# Patient Record
Sex: FEMALE | Race: WHITE | Employment: OTHER
[De-identification: names, ages, dates, MRNs, and addresses within clinical notes are randomized per-mention and may not be internally consistent; named-entity substitution may affect disease eponyms.]

---

## 2018-11-28 ENCOUNTER — RX ONLY (RX ONLY)
Age: 63
End: 2018-11-28

## 2018-11-28 ENCOUNTER — DOCTOR'S OFFICE (OUTPATIENT)
Dept: URBAN - METROPOLITAN AREA CLINIC 136 | Facility: CLINIC | Age: 63
Setting detail: OPHTHALMOLOGY
End: 2018-11-28
Payer: COMMERCIAL

## 2018-11-28 DIAGNOSIS — H27.8: ICD-10-CM

## 2018-11-28 DIAGNOSIS — E11.9: ICD-10-CM

## 2018-11-28 DIAGNOSIS — H27.9: ICD-10-CM

## 2018-11-28 DIAGNOSIS — H26.40: ICD-10-CM

## 2018-11-28 DIAGNOSIS — H40.053: ICD-10-CM

## 2018-11-28 DIAGNOSIS — H43.812: ICD-10-CM

## 2018-11-28 PROCEDURE — 92004 COMPRE OPH EXAM NEW PT 1/>: CPT | Performed by: OPHTHALMOLOGY

## 2018-11-28 PROCEDURE — 76514 ECHO EXAM OF EYE THICKNESS: CPT | Performed by: OPHTHALMOLOGY

## 2018-11-28 PROCEDURE — 92133 CPTRZD OPH DX IMG PST SGM ON: CPT | Performed by: OPHTHALMOLOGY

## 2018-11-28 ASSESSMENT — REFRACTION_MANIFEST
OD_VA2: 20/
OS_VA3: 20/
OS_VA2: 20/
OU_VA: 20/
OS_VA3: 20/
OD_VA1: 20/
OD_VA1: 20/
OD_VA2: 20/
OS_VA1: 20/
OS_VA2: 20/
OU_VA: 20/
OD_VA3: 20/
OD_VA3: 20/
OS_VA1: 20/

## 2018-11-28 ASSESSMENT — REFRACTION_AUTOREFRACTION
OD_AXIS: 092
OS_CYLINDER: -0.50
OD_SPHERE: +1.75
OS_SPHERE: +0.50
OD_CYLINDER: -0.75
OS_AXIS: 165

## 2018-11-28 ASSESSMENT — REFRACTION_CURRENTRX
OS_OVR_VA: 20/
OS_OVR_VA: 20/
OD_OVR_VA: 20/
OS_OVR_VA: 20/
OD_OVR_VA: 20/
OD_OVR_VA: 20/

## 2018-11-28 ASSESSMENT — PACHYMETRY
OD_CT_UM: 563
OS_CT_CORRECTION: -2
OS_CT_UM: 570
OD_CT_CORRECTION: -1

## 2018-11-28 ASSESSMENT — VISUAL ACUITY
OS_BCVA: 20/50+2
OD_BCVA: 20/30+2

## 2018-11-28 ASSESSMENT — SPHEQUIV_DERIVED
OD_SPHEQUIV: 1.375
OS_SPHEQUIV: 0.25

## 2018-11-28 ASSESSMENT — CONFRONTATIONAL VISUAL FIELD TEST (CVF)
OS_FINDINGS: FULL
OD_FINDINGS: FULL

## 2018-12-11 ENCOUNTER — DOCTOR'S OFFICE (OUTPATIENT)
Dept: URBAN - METROPOLITAN AREA CLINIC 136 | Facility: CLINIC | Age: 63
Setting detail: OPHTHALMOLOGY
End: 2018-12-11
Payer: COMMERCIAL

## 2018-12-11 DIAGNOSIS — H40.053: ICD-10-CM

## 2018-12-11 DIAGNOSIS — E11.9: ICD-10-CM

## 2018-12-11 PROCEDURE — 92012 INTRM OPH EXAM EST PATIENT: CPT | Performed by: OPHTHALMOLOGY

## 2018-12-11 ASSESSMENT — REFRACTION_MANIFEST
OD_VA3: 20/
OU_VA: 20/
OS_VA2: 20/
OD_VA2: 20/
OS_VA3: 20/
OU_VA: 20/
OD_VA1: 20/
OS_VA1: 20/
OS_VA1: 20/
OS_VA3: 20/
OD_VA1: 20/
OD_VA2: 20/
OS_VA2: 20/
OD_VA3: 20/

## 2018-12-11 ASSESSMENT — VISUAL ACUITY
OS_BCVA: 20/40-2
OD_BCVA: 20/30+1

## 2018-12-11 ASSESSMENT — REFRACTION_AUTOREFRACTION
OD_AXIS: 092
OS_SPHERE: +0.50
OD_SPHERE: +1.75
OD_CYLINDER: -0.75
OS_AXIS: 165
OS_CYLINDER: -0.50

## 2018-12-11 ASSESSMENT — REFRACTION_CURRENTRX
OD_OVR_VA: 20/
OD_OVR_VA: 20/
OS_OVR_VA: 20/
OS_OVR_VA: 20/
OD_OVR_VA: 20/
OS_OVR_VA: 20/

## 2018-12-11 ASSESSMENT — SPHEQUIV_DERIVED
OS_SPHEQUIV: 0.25
OD_SPHEQUIV: 1.375

## 2018-12-11 ASSESSMENT — CONFRONTATIONAL VISUAL FIELD TEST (CVF)
OD_FINDINGS: FULL
OS_FINDINGS: FULL

## 2019-01-08 ENCOUNTER — DOCTOR'S OFFICE (OUTPATIENT)
Dept: URBAN - METROPOLITAN AREA CLINIC 136 | Facility: CLINIC | Age: 64
Setting detail: OPHTHALMOLOGY
End: 2019-01-08
Payer: COMMERCIAL

## 2019-01-08 ENCOUNTER — RX ONLY (RX ONLY)
Age: 64
End: 2019-01-08

## 2019-01-08 DIAGNOSIS — H40.053: ICD-10-CM

## 2019-01-08 DIAGNOSIS — H20.011: ICD-10-CM

## 2019-01-08 PROCEDURE — 92012 INTRM OPH EXAM EST PATIENT: CPT | Performed by: OPHTHALMOLOGY

## 2019-01-08 ASSESSMENT — REFRACTION_CURRENTRX
OD_OVR_VA: 20/
OS_OVR_VA: 20/
OD_OVR_VA: 20/
OS_OVR_VA: 20/
OS_OVR_VA: 20/
OD_OVR_VA: 20/

## 2019-01-08 ASSESSMENT — REFRACTION_AUTOREFRACTION
OD_CYLINDER: -0.75
OD_AXIS: 092
OS_CYLINDER: -0.50
OS_AXIS: 165
OD_SPHERE: +1.75
OS_SPHERE: +0.50

## 2019-01-08 ASSESSMENT — REFRACTION_MANIFEST
OD_VA2: 20/
OS_VA3: 20/
OD_VA1: 20/
OS_VA2: 20/
OS_VA1: 20/
OS_VA3: 20/
OD_VA1: 20/
OU_VA: 20/
OD_VA2: 20/
OU_VA: 20/
OS_VA2: 20/
OD_VA3: 20/
OD_VA3: 20/
OS_VA1: 20/

## 2019-01-08 ASSESSMENT — VISUAL ACUITY
OD_BCVA: 20/25+2
OS_BCVA: 20/40-2

## 2019-01-08 ASSESSMENT — SPHEQUIV_DERIVED
OD_SPHEQUIV: 1.375
OS_SPHEQUIV: 0.25

## 2019-01-08 ASSESSMENT — CONFRONTATIONAL VISUAL FIELD TEST (CVF)
OS_FINDINGS: FULL
OD_FINDINGS: FULL

## 2019-01-22 ENCOUNTER — DOCTOR'S OFFICE (OUTPATIENT)
Dept: URBAN - METROPOLITAN AREA CLINIC 136 | Facility: CLINIC | Age: 64
Setting detail: OPHTHALMOLOGY
End: 2019-01-22
Payer: COMMERCIAL

## 2019-01-22 ENCOUNTER — RX ONLY (RX ONLY)
Age: 64
End: 2019-01-22

## 2019-01-22 DIAGNOSIS — H40.053: ICD-10-CM

## 2019-01-22 DIAGNOSIS — H20.011: ICD-10-CM

## 2019-01-22 PROCEDURE — 92012 INTRM OPH EXAM EST PATIENT: CPT | Performed by: OPHTHALMOLOGY

## 2019-01-22 ASSESSMENT — REFRACTION_CURRENTRX
OS_OVR_VA: 20/
OD_OVR_VA: 20/
OD_OVR_VA: 20/
OS_OVR_VA: 20/
OD_OVR_VA: 20/
OS_OVR_VA: 20/

## 2019-01-22 ASSESSMENT — VISUAL ACUITY
OS_BCVA: 20/40
OD_BCVA: 20/25-1

## 2019-01-22 ASSESSMENT — SPHEQUIV_DERIVED
OD_SPHEQUIV: 1.375
OS_SPHEQUIV: 0.25

## 2019-01-22 ASSESSMENT — REFRACTION_MANIFEST
OD_VA2: 20/
OS_VA3: 20/
OU_VA: 20/
OD_VA3: 20/
OS_VA2: 20/
OS_VA1: 20/
OS_VA3: 20/
OS_VA1: 20/
OD_VA1: 20/
OU_VA: 20/
OD_VA1: 20/
OS_VA2: 20/
OD_VA3: 20/
OD_VA2: 20/

## 2019-01-22 ASSESSMENT — REFRACTION_AUTOREFRACTION
OD_CYLINDER: -0.75
OS_CYLINDER: -0.50
OS_AXIS: 165
OD_SPHERE: +1.75
OD_AXIS: 092
OS_SPHERE: +0.50

## 2019-01-22 ASSESSMENT — CONFRONTATIONAL VISUAL FIELD TEST (CVF)
OD_FINDINGS: FULL
OS_FINDINGS: FULL

## 2019-01-29 ENCOUNTER — DOCTOR'S OFFICE (OUTPATIENT)
Dept: URBAN - METROPOLITAN AREA CLINIC 136 | Facility: CLINIC | Age: 64
Setting detail: OPHTHALMOLOGY
End: 2019-01-29
Payer: COMMERCIAL

## 2019-01-29 ENCOUNTER — RX ONLY (RX ONLY)
Age: 64
End: 2019-01-29

## 2019-01-29 DIAGNOSIS — H40.053: ICD-10-CM

## 2019-01-29 DIAGNOSIS — H20.011: ICD-10-CM

## 2019-01-29 PROCEDURE — 92012 INTRM OPH EXAM EST PATIENT: CPT | Performed by: OPHTHALMOLOGY

## 2019-01-29 ASSESSMENT — REFRACTION_CURRENTRX
OS_OVR_VA: 20/
OD_OVR_VA: 20/
OD_OVR_VA: 20/
OS_OVR_VA: 20/
OS_OVR_VA: 20/
OD_OVR_VA: 20/

## 2019-01-29 ASSESSMENT — REFRACTION_MANIFEST
OS_VA2: 20/
OD_VA3: 20/
OD_VA2: 20/
OS_VA1: 20/
OD_VA1: 20/
OS_VA3: 20/
OS_VA1: 20/
OS_VA2: 20/
OS_VA3: 20/
OD_VA1: 20/
OD_VA2: 20/
OD_VA3: 20/
OU_VA: 20/
OU_VA: 20/

## 2019-01-29 ASSESSMENT — REFRACTION_AUTOREFRACTION
OD_CYLINDER: -0.50
OD_AXIS: 070
OS_SPHERE: PLANO
OD_SPHERE: +1.00
OS_CYLINDER: -0.25
OS_AXIS: 081

## 2019-01-29 ASSESSMENT — CONFRONTATIONAL VISUAL FIELD TEST (CVF)
OD_FINDINGS: FULL
OS_FINDINGS: FULL

## 2019-01-29 ASSESSMENT — VISUAL ACUITY
OS_BCVA: 20/40
OD_BCVA: 20/25-1

## 2019-01-29 ASSESSMENT — SPHEQUIV_DERIVED: OD_SPHEQUIV: 0.75

## 2019-02-27 ENCOUNTER — DOCTOR'S OFFICE (OUTPATIENT)
Dept: URBAN - METROPOLITAN AREA CLINIC 136 | Facility: CLINIC | Age: 64
Setting detail: OPHTHALMOLOGY
End: 2019-02-27
Payer: COMMERCIAL

## 2019-02-27 DIAGNOSIS — H20.011: ICD-10-CM

## 2019-02-27 DIAGNOSIS — H40.053: ICD-10-CM

## 2019-02-27 PROCEDURE — 99212 OFFICE O/P EST SF 10 MIN: CPT | Performed by: OPHTHALMOLOGY

## 2019-02-27 ASSESSMENT — REFRACTION_MANIFEST
OS_VA1: 20/
OD_VA1: 20/
OD_VA3: 20/
OD_VA1: 20/
OS_VA2: 20/
OS_VA1: 20/
OD_VA3: 20/
OU_VA: 20/
OD_VA2: 20/
OS_VA3: 20/
OD_VA2: 20/
OS_VA3: 20/
OU_VA: 20/
OS_VA2: 20/

## 2019-02-27 ASSESSMENT — CONFRONTATIONAL VISUAL FIELD TEST (CVF)
OD_FINDINGS: FULL
OS_FINDINGS: FULL

## 2019-02-27 ASSESSMENT — REFRACTION_CURRENTRX
OS_OVR_VA: 20/
OS_OVR_VA: 20/
OD_OVR_VA: 20/
OS_OVR_VA: 20/

## 2019-02-27 ASSESSMENT — REFRACTION_AUTOREFRACTION
OS_AXIS: 081
OD_CYLINDER: -0.50
OD_AXIS: 070
OS_SPHERE: PLANO
OD_SPHERE: +1.00
OS_CYLINDER: -0.25

## 2019-02-27 ASSESSMENT — VISUAL ACUITY
OS_BCVA: 20/25-2
OD_BCVA: 20/25-1

## 2019-02-27 ASSESSMENT — SPHEQUIV_DERIVED: OD_SPHEQUIV: 0.75

## 2019-04-09 ENCOUNTER — DOCTOR'S OFFICE (OUTPATIENT)
Dept: URBAN - METROPOLITAN AREA CLINIC 136 | Facility: CLINIC | Age: 64
Setting detail: OPHTHALMOLOGY
End: 2019-04-09
Payer: COMMERCIAL

## 2019-04-09 DIAGNOSIS — H40.053: ICD-10-CM

## 2019-04-09 DIAGNOSIS — H20.011: ICD-10-CM

## 2019-04-09 PROCEDURE — 92012 INTRM OPH EXAM EST PATIENT: CPT | Performed by: OPHTHALMOLOGY

## 2019-04-09 ASSESSMENT — REFRACTION_MANIFEST
OS_VA3: 20/
OD_VA1: 20/
OD_VA1: 20/
OS_VA2: 20/
OD_VA3: 20/
OU_VA: 20/
OD_VA3: 20/
OD_VA2: 20/
OS_VA1: 20/
OS_VA3: 20/
OD_VA2: 20/
OS_VA1: 20/
OS_VA2: 20/
OU_VA: 20/

## 2019-04-09 ASSESSMENT — TEAR BREAK UP TIME (TBUT)
OS_TBUT: 1+
OD_TBUT: 1+

## 2019-04-09 ASSESSMENT — REFRACTION_AUTOREFRACTION
OS_AXIS: 081
OD_AXIS: 070
OD_CYLINDER: -0.50
OS_CYLINDER: -0.25
OS_SPHERE: PLANO
OD_SPHERE: +1.00

## 2019-04-09 ASSESSMENT — REFRACTION_CURRENTRX
OD_OVR_VA: 20/
OS_OVR_VA: 20/
OS_OVR_VA: 20/
OD_OVR_VA: 20/
OS_OVR_VA: 20/
OD_OVR_VA: 20/

## 2019-04-09 ASSESSMENT — VISUAL ACUITY
OS_BCVA: 20/30-2
OD_BCVA: 20/25-2

## 2019-04-09 ASSESSMENT — SPHEQUIV_DERIVED: OD_SPHEQUIV: 0.75

## 2019-05-21 ENCOUNTER — DOCTOR'S OFFICE (OUTPATIENT)
Dept: URBAN - METROPOLITAN AREA CLINIC 136 | Facility: CLINIC | Age: 64
Setting detail: OPHTHALMOLOGY
End: 2019-05-21
Payer: COMMERCIAL

## 2019-05-21 DIAGNOSIS — H20.011: ICD-10-CM

## 2019-05-21 DIAGNOSIS — H40.053: ICD-10-CM

## 2019-05-21 PROCEDURE — 92012 INTRM OPH EXAM EST PATIENT: CPT | Performed by: OPHTHALMOLOGY

## 2019-05-21 ASSESSMENT — REFRACTION_AUTOREFRACTION
OD_SPHERE: +2.00
OS_SPHERE: +1.00
OD_AXIS: 078
OS_AXIS: 026
OS_CYLINDER: -0.75
OD_CYLINDER: -0.75

## 2019-05-21 ASSESSMENT — REFRACTION_CURRENTRX
OD_OVR_VA: 20/
OS_OVR_VA: 20/
OD_OVR_VA: 20/
OS_OVR_VA: 20/
OS_OVR_VA: 20/
OD_OVR_VA: 20/

## 2019-05-21 ASSESSMENT — REFRACTION_MANIFEST
OU_VA: 20/
OU_VA: 20/
OD_VA3: 20/
OD_VA2: 20/
OS_VA3: 20/
OD_VA1: 20/
OD_VA1: 20/
OS_VA1: 20/
OS_VA3: 20/
OS_VA1: 20/
OD_VA2: 20/
OS_VA2: 20/
OS_VA2: 20/
OD_VA3: 20/

## 2019-05-21 ASSESSMENT — TEAR BREAK UP TIME (TBUT)
OD_TBUT: 1+
OS_TBUT: 1+

## 2019-05-21 ASSESSMENT — VISUAL ACUITY
OS_BCVA: 20/30-2
OD_BCVA: 20/40

## 2019-05-21 ASSESSMENT — SPHEQUIV_DERIVED
OD_SPHEQUIV: 1.625
OS_SPHEQUIV: 0.625

## 2019-05-21 ASSESSMENT — CONFRONTATIONAL VISUAL FIELD TEST (CVF)
OS_FINDINGS: FULL
OD_FINDINGS: FULL

## 2019-08-21 ENCOUNTER — DOCTOR'S OFFICE (OUTPATIENT)
Dept: URBAN - METROPOLITAN AREA CLINIC 136 | Facility: CLINIC | Age: 64
Setting detail: OPHTHALMOLOGY
End: 2019-08-21
Payer: COMMERCIAL

## 2019-08-21 DIAGNOSIS — H40.053: ICD-10-CM

## 2019-08-21 PROCEDURE — 92012 INTRM OPH EXAM EST PATIENT: CPT | Performed by: OPHTHALMOLOGY

## 2019-08-21 ASSESSMENT — REFRACTION_CURRENTRX
OS_OVR_VA: 20/
OD_OVR_VA: 20/
OD_OVR_VA: 20/
OS_OVR_VA: 20/
OD_OVR_VA: 20/
OS_OVR_VA: 20/

## 2019-08-21 ASSESSMENT — REFRACTION_MANIFEST
OS_VA1: 20/
OS_VA3: 20/
OS_VA2: 20/
OD_VA2: 20/
OU_VA: 20/
OD_VA2: 20/
OD_VA3: 20/
OD_VA1: 20/
OU_VA: 20/
OD_VA1: 20/
OS_VA3: 20/
OS_VA2: 20/
OS_VA1: 20/
OD_VA3: 20/

## 2019-08-21 ASSESSMENT — SPHEQUIV_DERIVED
OS_SPHEQUIV: 0.625
OD_SPHEQUIV: 1.625

## 2019-08-21 ASSESSMENT — SUPERFICIAL PUNCTATE KERATITIS (SPK)
OS_SPK: 1+ 2+
OD_SPK: 1+ 2+

## 2019-08-21 ASSESSMENT — TEAR BREAK UP TIME (TBUT)
OD_TBUT: 1+
OS_TBUT: 1+

## 2019-08-21 ASSESSMENT — REFRACTION_AUTOREFRACTION
OS_SPHERE: +1.00
OS_AXIS: 026
OS_CYLINDER: -0.75
OD_AXIS: 078
OD_SPHERE: +2.00
OD_CYLINDER: -0.75

## 2019-08-21 ASSESSMENT — VISUAL ACUITY
OD_BCVA: 20/25-2
OS_BCVA: 20/40+1

## 2019-10-01 ENCOUNTER — DOCTOR'S OFFICE (OUTPATIENT)
Dept: URBAN - METROPOLITAN AREA CLINIC 136 | Facility: CLINIC | Age: 64
Setting detail: OPHTHALMOLOGY
End: 2019-10-01
Payer: COMMERCIAL

## 2019-10-01 DIAGNOSIS — H40.053: ICD-10-CM

## 2019-10-01 DIAGNOSIS — H04.123: ICD-10-CM

## 2019-10-01 PROCEDURE — 92012 INTRM OPH EXAM EST PATIENT: CPT | Performed by: OPHTHALMOLOGY

## 2019-10-01 ASSESSMENT — REFRACTION_MANIFEST
OS_VA2: 20/
OD_VA2: 20/
OD_VA1: 20/
OD_VA3: 20/
OD_VA3: 20/
OU_VA: 20/
OS_VA1: 20/
OS_VA3: 20/
OS_VA3: 20/
OS_VA1: 20/
OU_VA: 20/
OD_VA2: 20/
OS_VA2: 20/
OD_VA1: 20/

## 2019-10-01 ASSESSMENT — REFRACTION_CURRENTRX
OS_OVR_VA: 20/
OS_OVR_VA: 20/
OD_OVR_VA: 20/
OS_OVR_VA: 20/
OD_OVR_VA: 20/
OD_OVR_VA: 20/

## 2019-10-01 ASSESSMENT — KERATOMETRY
OD_AXISANGLE_DEGREES: 090
OS_AXISANGLE_DEGREES: 078
OD_K1POWER_DIOPTERS: 46.75
OS_K1POWER_DIOPTERS: 46.50
OD_K2POWER_DIOPTERS: 46.75
OS_K2POWER_DIOPTERS: 47.00

## 2019-10-01 ASSESSMENT — AXIALLENGTH_DERIVED
OS_AL: 22.3677
OD_AL: 21.9806

## 2019-10-01 ASSESSMENT — REFRACTION_AUTOREFRACTION
OS_SPHERE: +0.25
OS_AXIS: 000
OD_SPHERE: +1.50
OD_AXIS: 090
OS_CYLINDER: 0.00
OD_CYLINDER: -0.25

## 2019-10-01 ASSESSMENT — SPHEQUIV_DERIVED
OS_SPHEQUIV: 0.25
OD_SPHEQUIV: 1.375

## 2019-10-01 ASSESSMENT — CONFRONTATIONAL VISUAL FIELD TEST (CVF)
OD_FINDINGS: FULL
OS_FINDINGS: FULL

## 2019-10-01 ASSESSMENT — VISUAL ACUITY
OS_BCVA: 20/40+2
OD_BCVA: 20/20-1

## 2019-10-01 ASSESSMENT — TEAR BREAK UP TIME (TBUT)
OD_TBUT: 1+
OS_TBUT: 1+

## 2020-01-07 ENCOUNTER — DOCTOR'S OFFICE (OUTPATIENT)
Dept: URBAN - METROPOLITAN AREA CLINIC 136 | Facility: CLINIC | Age: 65
Setting detail: OPHTHALMOLOGY
End: 2020-01-07
Payer: COMMERCIAL

## 2020-01-07 DIAGNOSIS — H04.121: ICD-10-CM

## 2020-01-07 DIAGNOSIS — H26.40: ICD-10-CM

## 2020-01-07 DIAGNOSIS — H04.122: ICD-10-CM

## 2020-01-07 DIAGNOSIS — H40.053: ICD-10-CM

## 2020-01-07 PROCEDURE — 92014 COMPRE OPH EXAM EST PT 1/>: CPT | Performed by: OPHTHALMOLOGY

## 2020-01-07 PROCEDURE — 76514 ECHO EXAM OF EYE THICKNESS: CPT | Performed by: OPHTHALMOLOGY

## 2020-01-07 PROCEDURE — 92133 CPTRZD OPH DX IMG PST SGM ON: CPT | Performed by: OPHTHALMOLOGY

## 2020-01-07 ASSESSMENT — REFRACTION_AUTOREFRACTION
OD_AXIS: 090
OS_AXIS: 000
OS_SPHERE: +0.25
OS_CYLINDER: 0.00
OD_CYLINDER: -0.25
OD_SPHERE: +1.50

## 2020-01-07 ASSESSMENT — TEAR BREAK UP TIME (TBUT)
OD_TBUT: 1+
OS_TBUT: 1+

## 2020-01-07 ASSESSMENT — CONFRONTATIONAL VISUAL FIELD TEST (CVF)
OD_FINDINGS: FULL
OS_FINDINGS: FULL

## 2020-01-07 ASSESSMENT — PACHYMETRY
OD_CT_CORRECTION: -1
OD_CT_UM: 558
OS_CT_CORRECTION: -1
OS_CT_UM: 558

## 2020-01-07 ASSESSMENT — VISUAL ACUITY
OS_BCVA: 20/30-2
OD_BCVA: 20/20-1

## 2020-01-07 ASSESSMENT — KERATOMETRY
OD_K1POWER_DIOPTERS: 46.75
OS_AXISANGLE_DEGREES: 078
OS_K1POWER_DIOPTERS: 46.50
OS_K2POWER_DIOPTERS: 47.00
OD_AXISANGLE_DEGREES: 090
OD_K2POWER_DIOPTERS: 46.75

## 2020-01-07 ASSESSMENT — SPHEQUIV_DERIVED
OS_SPHEQUIV: 0.25
OD_SPHEQUIV: 1.375

## 2020-01-07 ASSESSMENT — AXIALLENGTH_DERIVED
OS_AL: 22.3677
OD_AL: 21.9806

## 2020-05-27 ENCOUNTER — DOCTOR'S OFFICE (OUTPATIENT)
Dept: URBAN - METROPOLITAN AREA CLINIC 136 | Facility: CLINIC | Age: 65
Setting detail: OPHTHALMOLOGY
End: 2020-05-27
Payer: COMMERCIAL

## 2020-05-27 DIAGNOSIS — H40.053: ICD-10-CM

## 2020-05-27 DIAGNOSIS — H04.123: ICD-10-CM

## 2020-05-27 DIAGNOSIS — H26.40: ICD-10-CM

## 2020-05-27 PROCEDURE — 92014 COMPRE OPH EXAM EST PT 1/>: CPT | Performed by: OPHTHALMOLOGY

## 2020-05-27 ASSESSMENT — AXIALLENGTH_DERIVED
OS_AL: 22.3677
OD_AL: 21.9806

## 2020-05-27 ASSESSMENT — VISUAL ACUITY
OD_BCVA: 20/20-1
OS_BCVA: 20/25-2

## 2020-05-27 ASSESSMENT — SPHEQUIV_DERIVED
OD_SPHEQUIV: 1.375
OS_SPHEQUIV: 0.25

## 2020-05-27 ASSESSMENT — KERATOMETRY
OD_K1POWER_DIOPTERS: 46.75
OD_K2POWER_DIOPTERS: 46.75
OS_K2POWER_DIOPTERS: 47.00
OD_AXISANGLE_DEGREES: 090
OS_K1POWER_DIOPTERS: 46.50
OS_AXISANGLE_DEGREES: 078

## 2020-05-27 ASSESSMENT — REFRACTION_AUTOREFRACTION
OS_AXIS: 000
OD_SPHERE: +1.50
OD_AXIS: 090
OS_SPHERE: +0.25
OS_CYLINDER: 0.00
OD_CYLINDER: -0.25

## 2020-05-27 ASSESSMENT — TEAR BREAK UP TIME (TBUT)
OD_TBUT: 1+
OS_TBUT: 1+

## 2020-05-27 ASSESSMENT — CONFRONTATIONAL VISUAL FIELD TEST (CVF)
OS_FINDINGS: FULL
OD_FINDINGS: FULL

## 2020-06-16 ENCOUNTER — DOCTOR'S OFFICE (OUTPATIENT)
Dept: URBAN - METROPOLITAN AREA CLINIC 136 | Facility: CLINIC | Age: 65
Setting detail: OPHTHALMOLOGY
End: 2020-06-16
Payer: COMMERCIAL

## 2020-06-16 DIAGNOSIS — H20.11: ICD-10-CM

## 2020-06-16 DIAGNOSIS — H26.40: ICD-10-CM

## 2020-06-16 DIAGNOSIS — H04.123: ICD-10-CM

## 2020-06-16 DIAGNOSIS — E11.9: ICD-10-CM

## 2020-06-16 DIAGNOSIS — H40.41X4: ICD-10-CM

## 2020-06-16 DIAGNOSIS — H43.812: ICD-10-CM

## 2020-06-16 DIAGNOSIS — H40.022: ICD-10-CM

## 2020-06-16 PROCEDURE — 92014 COMPRE OPH EXAM EST PT 1/>: CPT | Performed by: OPHTHALMOLOGY

## 2020-06-16 PROCEDURE — 92020 GONIOSCOPY: CPT | Performed by: OPHTHALMOLOGY

## 2020-06-16 PROCEDURE — 92202 OPSCPY EXTND ON/MAC DRAW: CPT | Performed by: OPHTHALMOLOGY

## 2020-06-16 ASSESSMENT — TEAR BREAK UP TIME (TBUT)
OS_TBUT: 1+
OD_TBUT: 1+

## 2020-06-16 ASSESSMENT — CONFRONTATIONAL VISUAL FIELD TEST (CVF)
OD_FINDINGS: FULL
OS_FINDINGS: FULL

## 2020-06-28 ASSESSMENT — REFRACTION_AUTOREFRACTION
OD_SPHERE: +1.50
OD_CYLINDER: -0.25
OS_CYLINDER: 0.00
OS_SPHERE: +0.25
OS_AXIS: 000
OD_AXIS: 090

## 2020-06-28 ASSESSMENT — KERATOMETRY
OD_K1POWER_DIOPTERS: 46.75
OS_K2POWER_DIOPTERS: 47.00
OS_AXISANGLE_DEGREES: 078
OD_AXISANGLE_DEGREES: 090
OS_K1POWER_DIOPTERS: 46.50
OD_K2POWER_DIOPTERS: 46.75

## 2020-06-28 ASSESSMENT — SPHEQUIV_DERIVED
OS_SPHEQUIV: 0.25
OD_SPHEQUIV: 1.375

## 2020-06-28 ASSESSMENT — AXIALLENGTH_DERIVED
OD_AL: 21.9806
OS_AL: 22.3677

## 2020-06-28 ASSESSMENT — VISUAL ACUITY
OS_BCVA: 20/30+2
OD_BCVA: 20/20

## 2020-07-07 ENCOUNTER — DOCTOR'S OFFICE (OUTPATIENT)
Dept: URBAN - METROPOLITAN AREA CLINIC 136 | Facility: CLINIC | Age: 65
Setting detail: OPHTHALMOLOGY
End: 2020-07-07
Payer: COMMERCIAL

## 2020-07-07 ENCOUNTER — RX ONLY (RX ONLY)
Age: 65
End: 2020-07-07

## 2020-07-07 DIAGNOSIS — H40.1214: ICD-10-CM

## 2020-07-07 DIAGNOSIS — H53.031: ICD-10-CM

## 2020-07-07 DIAGNOSIS — E11.9: ICD-10-CM

## 2020-07-07 DIAGNOSIS — H04.123: ICD-10-CM

## 2020-07-07 DIAGNOSIS — H20.11: ICD-10-CM

## 2020-07-07 DIAGNOSIS — H43.812: ICD-10-CM

## 2020-07-07 DIAGNOSIS — H40.022: ICD-10-CM

## 2020-07-07 DIAGNOSIS — H40.41X4: ICD-10-CM

## 2020-07-07 PROCEDURE — 92083 EXTENDED VISUAL FIELD XM: CPT | Performed by: OPHTHALMOLOGY

## 2020-07-07 PROCEDURE — 92014 COMPRE OPH EXAM EST PT 1/>: CPT | Performed by: OPHTHALMOLOGY

## 2020-07-07 PROCEDURE — 92250 FUNDUS PHOTOGRAPHY W/I&R: CPT | Performed by: OPHTHALMOLOGY

## 2020-07-07 ASSESSMENT — TEAR BREAK UP TIME (TBUT)
OD_TBUT: 1+
OS_TBUT: 1+

## 2020-07-07 ASSESSMENT — CONFRONTATIONAL VISUAL FIELD TEST (CVF)
OD_FINDINGS: FULL
OS_FINDINGS: FULL

## 2020-07-21 ASSESSMENT — REFRACTION_AUTOREFRACTION
OD_SPHERE: +1.50
OS_SPHERE: +0.25
OS_CYLINDER: 0.00
OS_AXIS: 000
OD_AXIS: 090
OD_CYLINDER: -0.25

## 2020-07-21 ASSESSMENT — VISUAL ACUITY
OS_BCVA: 20/30-2
OD_BCVA: 20/25+1

## 2020-07-21 ASSESSMENT — KERATOMETRY
OS_AXISANGLE_DEGREES: 078
OS_K1POWER_DIOPTERS: 46.50
OD_K1POWER_DIOPTERS: 46.75
OD_AXISANGLE_DEGREES: 090
OS_K2POWER_DIOPTERS: 47.00
OD_K2POWER_DIOPTERS: 46.75

## 2020-07-21 ASSESSMENT — SPHEQUIV_DERIVED
OS_SPHEQUIV: 0.25
OD_SPHEQUIV: 1.375

## 2020-07-21 ASSESSMENT — AXIALLENGTH_DERIVED
OS_AL: 22.3677
OD_AL: 21.9806

## 2020-08-13 ENCOUNTER — DOCTOR'S OFFICE (OUTPATIENT)
Dept: URBAN - METROPOLITAN AREA CLINIC 136 | Facility: CLINIC | Age: 65
Setting detail: OPHTHALMOLOGY
End: 2020-08-13
Payer: COMMERCIAL

## 2020-08-13 DIAGNOSIS — H04.122: ICD-10-CM

## 2020-08-13 DIAGNOSIS — H40.41X4: ICD-10-CM

## 2020-08-13 DIAGNOSIS — H43.812: ICD-10-CM

## 2020-08-13 DIAGNOSIS — H40.022: ICD-10-CM

## 2020-08-13 DIAGNOSIS — H40.1214: ICD-10-CM

## 2020-08-13 DIAGNOSIS — H20.11: ICD-10-CM

## 2020-08-13 DIAGNOSIS — H04.121: ICD-10-CM

## 2020-08-13 DIAGNOSIS — H53.031: ICD-10-CM

## 2020-08-13 PROCEDURE — 92012 INTRM OPH EXAM EST PATIENT: CPT | Performed by: OPHTHALMOLOGY

## 2020-08-13 ASSESSMENT — REFRACTION_AUTOREFRACTION
OD_SPHERE: +1.50
OD_CYLINDER: -0.25
OS_CYLINDER: 0.00
OD_AXIS: 090
OS_SPHERE: +0.25
OS_AXIS: 000

## 2020-08-13 ASSESSMENT — SPHEQUIV_DERIVED
OS_SPHEQUIV: 0.25
OD_SPHEQUIV: 1.375

## 2020-08-13 ASSESSMENT — TEAR BREAK UP TIME (TBUT)
OS_TBUT: 1+
OD_TBUT: 1+

## 2020-08-13 ASSESSMENT — KERATOMETRY
OD_AXISANGLE_DEGREES: 090
OD_K2POWER_DIOPTERS: 46.75
OD_K1POWER_DIOPTERS: 46.75
OS_AXISANGLE_DEGREES: 078
OS_K2POWER_DIOPTERS: 47.00
OS_K1POWER_DIOPTERS: 46.50

## 2020-08-13 ASSESSMENT — AXIALLENGTH_DERIVED
OS_AL: 22.3677
OD_AL: 21.9806

## 2020-08-13 ASSESSMENT — VISUAL ACUITY
OD_BCVA: 20/25
OS_BCVA: 20/30-1

## 2020-10-19 ENCOUNTER — DOCTOR'S OFFICE (OUTPATIENT)
Dept: URBAN - METROPOLITAN AREA CLINIC 136 | Facility: CLINIC | Age: 65
Setting detail: OPHTHALMOLOGY
End: 2020-10-19
Payer: COMMERCIAL

## 2020-10-19 ENCOUNTER — RX ONLY (RX ONLY)
Age: 65
End: 2020-10-19

## 2020-10-19 DIAGNOSIS — E11.9: ICD-10-CM

## 2020-10-19 DIAGNOSIS — H40.022: ICD-10-CM

## 2020-10-19 DIAGNOSIS — H40.1214: ICD-10-CM

## 2020-10-19 DIAGNOSIS — H04.122: ICD-10-CM

## 2020-10-19 DIAGNOSIS — H43.812: ICD-10-CM

## 2020-10-19 DIAGNOSIS — H27.8: ICD-10-CM

## 2020-10-19 DIAGNOSIS — H40.41X4: ICD-10-CM

## 2020-10-19 DIAGNOSIS — H04.121: ICD-10-CM

## 2020-10-19 DIAGNOSIS — H53.031: ICD-10-CM

## 2020-10-19 DIAGNOSIS — H26.40: ICD-10-CM

## 2020-10-19 DIAGNOSIS — H20.11: ICD-10-CM

## 2020-10-19 PROCEDURE — 92012 INTRM OPH EXAM EST PATIENT: CPT | Performed by: OPHTHALMOLOGY

## 2020-10-19 ASSESSMENT — TONOMETRY
OD_IOP_MMHG: 19
OS_IOP_MMHG: 13

## 2020-10-19 ASSESSMENT — TEAR BREAK UP TIME (TBUT)
OS_TBUT: 1+
OD_TBUT: 1+

## 2020-10-19 ASSESSMENT — PACHYMETRY
OD_CT_CORRECTION: -1
OS_CT_CORRECTION: -1
OD_CT_UM: 558
OS_CT_UM: 558

## 2020-10-19 ASSESSMENT — CONFRONTATIONAL VISUAL FIELD TEST (CVF)
OS_FINDINGS: CONSTRICTION
OD_FINDINGS: FULL

## 2020-12-31 ASSESSMENT — KERATOMETRY
OS_AXISANGLE_DEGREES: 078
OD_K1POWER_DIOPTERS: 46.75
OS_K2POWER_DIOPTERS: 47.00
OD_AXISANGLE_DEGREES: 090
OS_K1POWER_DIOPTERS: 46.50
OD_K2POWER_DIOPTERS: 46.75

## 2020-12-31 ASSESSMENT — SPHEQUIV_DERIVED
OS_SPHEQUIV: 0.25
OD_SPHEQUIV: 1.375

## 2020-12-31 ASSESSMENT — REFRACTION_AUTOREFRACTION
OD_AXIS: 090
OD_CYLINDER: -0.25
OS_SPHERE: +0.25
OS_CYLINDER: 0.00
OD_SPHERE: +1.50
OS_AXIS: 000

## 2020-12-31 ASSESSMENT — AXIALLENGTH_DERIVED
OS_AL: 22.3677
OD_AL: 21.9806

## 2020-12-31 ASSESSMENT — VISUAL ACUITY
OS_BCVA: 20/30-1
OD_BCVA: 20/25

## 2021-01-20 ENCOUNTER — DOCTOR'S OFFICE (OUTPATIENT)
Dept: URBAN - METROPOLITAN AREA CLINIC 136 | Facility: CLINIC | Age: 66
Setting detail: OPHTHALMOLOGY
End: 2021-01-20
Payer: COMMERCIAL

## 2021-01-20 DIAGNOSIS — H40.1214: ICD-10-CM

## 2021-01-20 DIAGNOSIS — H26.40: ICD-10-CM

## 2021-01-20 DIAGNOSIS — H27.8: ICD-10-CM

## 2021-01-20 DIAGNOSIS — H40.022: ICD-10-CM

## 2021-01-20 DIAGNOSIS — H40.41X4: ICD-10-CM

## 2021-01-20 DIAGNOSIS — H20.11: ICD-10-CM

## 2021-01-20 DIAGNOSIS — H04.121: ICD-10-CM

## 2021-01-20 DIAGNOSIS — H04.122: ICD-10-CM

## 2021-01-20 DIAGNOSIS — H43.812: ICD-10-CM

## 2021-01-20 DIAGNOSIS — E11.9: ICD-10-CM

## 2021-01-20 DIAGNOSIS — H53.031: ICD-10-CM

## 2021-01-20 PROBLEM — H27.9 PSEUDOPHAKIA ; BOTH EYES: Status: ACTIVE | Noted: 2018-11-28

## 2021-01-20 PROCEDURE — 92012 INTRM OPH EXAM EST PATIENT: CPT | Performed by: OPHTHALMOLOGY

## 2021-01-20 PROCEDURE — 92133 CPTRZD OPH DX IMG PST SGM ON: CPT | Performed by: OPHTHALMOLOGY

## 2021-01-20 ASSESSMENT — VISUAL ACUITY
OD_BCVA: 20/25-2
OS_BCVA: 20/30+1

## 2021-01-20 ASSESSMENT — REFRACTION_AUTOREFRACTION
OS_SPHERE: +0.75
OS_AXIS: 154
OD_SPHERE: +1.50
OD_CYLINDER: -1.50
OD_AXIS: 104
OS_CYLINDER: -0.75

## 2021-01-20 ASSESSMENT — PACHYMETRY
OS_CT_UM: 558
OD_CT_CORRECTION: -1
OD_CT_UM: 558
OS_CT_CORRECTION: -1

## 2021-01-20 ASSESSMENT — TONOMETRY
OD_IOP_MMHG: 18
OS_IOP_MMHG: 16

## 2021-01-20 ASSESSMENT — KERATOMETRY
OD_K1POWER_DIOPTERS: 46.75
OD_AXISANGLE_DEGREES: 090
OS_K2POWER_DIOPTERS: 47.00
OS_K1POWER_DIOPTERS: 46.50
OD_K2POWER_DIOPTERS: 46.75
OS_AXISANGLE_DEGREES: 078

## 2021-01-20 ASSESSMENT — TEAR BREAK UP TIME (TBUT)
OS_TBUT: 1+
OD_TBUT: 1+

## 2021-01-20 ASSESSMENT — CONFRONTATIONAL VISUAL FIELD TEST (CVF)
OS_FINDINGS: FULL
OD_FINDINGS: FULL

## 2021-01-20 ASSESSMENT — SPHEQUIV_DERIVED
OD_SPHEQUIV: 0.75
OS_SPHEQUIV: 0.375

## 2021-01-20 ASSESSMENT — AXIALLENGTH_DERIVED
OS_AL: 22.3241
OD_AL: 22.194

## 2024-02-28 ENCOUNTER — OFFICE VISIT (OUTPATIENT)
Dept: PODIATRY | Facility: CLINIC | Age: 69
End: 2024-02-28

## 2024-02-28 VITALS — HEART RATE: 63 BPM | SYSTOLIC BLOOD PRESSURE: 108 MMHG | DIASTOLIC BLOOD PRESSURE: 57 MMHG

## 2024-02-28 DIAGNOSIS — E11.9 CONTROLLED TYPE 2 DIABETES MELLITUS WITHOUT COMPLICATION, WITHOUT LONG-TERM CURRENT USE OF INSULIN (HCC): Primary | ICD-10-CM

## 2024-02-28 PROCEDURE — 99203 OFFICE O/P NEW LOW 30 MIN: CPT | Performed by: PODIATRIST

## 2024-02-28 NOTE — PROGRESS NOTES
Assessment/Plan:    Discussed principles of diabetic footcare.  Vascular status is within normal limits and sensorium is intact.  All elongated toenails were trimmed.  Left second toenail is dystrophic from prior injury.  Reappoint as needed    No problem-specific Assessment & Plan notes found for this encounter.       Diagnoses and all orders for this visit:    Controlled type 2 diabetes mellitus without complication, without long-term current use of insulin ([facility])          Subjective:      Patient ID: Amy Valenzuela is a 68 y.o. female.    HPI    Patient, a 68-year-old type II diabetic female presents for yearly diabetic foot exam and toenail care.  Chief complaint involves long nails that she has difficulty trimming.  Patient was recently hospitalized due to severe swelling in her legs.  She has responded to diuretic.  She denies numbness or tingling in her feet.    I personally reviewed a blood glucose taken at 7 AM in the morning on 1/26/2024.  It was 103.      I personally reviewed a blood glucose dated 12/24/2023  At 11:50 AM.  It was 191.    No A1c noted in her labs.    The following portions of the patient's history were reviewed and updated as appropriate: allergies, current medications, past family history, past medical history, past social history, past surgical history, and problem list.    Review of Systems   Genitourinary:         History of hepatic cirrhosis--nonalcoholic   Musculoskeletal: Negative.    Neurological:  Negative for numbness.   Psychiatric/Behavioral: Negative.               Objective:      /57 (BP Location: Right arm, Patient Position: Sitting, Cuff Size: Standard)   Pulse 63          Physical Exam  Cardiovascular:      Pulses: no weak pulses.           Dorsalis pedis pulses are 2+ on the right side and 2+ on the left side.        Posterior tibial pulses are 1+ on the right side and 1+ on the left side.   Feet:      Right foot:      Skin integrity: No ulcer, skin breakdown,  erythema, warmth, callus or dry skin.      Left foot:      Skin integrity: No ulcer, skin breakdown, erythema, warmth, callus or dry skin.           Diabetic Foot Exam    Patient's shoes and socks removed.    Right Foot/Ankle   Right Foot Inspection  Skin Exam: skin normal and skin intact. No dry skin, no warmth, no callus, no erythema, no maceration, no abnormal color, no pre-ulcer, no ulcer and no callus.     Toe Exam: ROM and strength within normal limits.     Sensory   Vibration: intact  Proprioception: intact  Monofilament testing: intact    Vascular  Capillary refills: < 3 seconds  The right DP pulse is 2+. The right PT pulse is 1+.     Right Toe  - Comprehensive Exam  Ecchymosis: none  Arch: normal  Hammertoes: absent  Claw Toes: absent  Swelling: none   Tenderness: none       Left Foot/Ankle  Left Foot Inspection  Skin Exam: skin normal and skin intact. No dry skin, no warmth, no erythema, no maceration, normal color, no pre-ulcer, no ulcer and no callus.     Toe Exam: ROM and strength within normal limits.     Sensory   Vibration: intact  Proprioception: intact  Monofilament testing: intact    Vascular  Capillary refills: < 3 seconds  The left DP pulse is 2+. The left PT pulse is 1+.     Left Toe  - Comprehensive Exam  Ecchymosis: none  Arch: normal  Hammertoes: absent  Claw toes: absent  Swelling: none   Tenderness: none       Assign Risk Category  No deformity present  No loss of protective sensation  No weak pulses  Risk: 0